# Patient Record
Sex: FEMALE | Race: WHITE | NOT HISPANIC OR LATINO | Employment: OTHER | URBAN - METROPOLITAN AREA
[De-identification: names, ages, dates, MRNs, and addresses within clinical notes are randomized per-mention and may not be internally consistent; named-entity substitution may affect disease eponyms.]

---

## 2020-01-29 NOTE — PATIENT DISCUSSION
Patient understands increased risk of complex surgery with possibility of no improvement, loss of vision, infection, hemorrhage, retinal detachment, pain, and more surgery required. Pt aware 50% chance of capsular break and require 2nd surgery.

## 2020-01-29 NOTE — PATIENT DISCUSSION
Book For 30 min time slot / [place] / COMPLEX / BLOCK/ Posterior Polar cataract. Planned anterior vitrectomy.

## 2022-04-20 ENCOUNTER — COMPREHENSIVE EXAM (OUTPATIENT)
Dept: URBAN - METROPOLITAN AREA CLINIC 37 | Facility: CLINIC | Age: 67
End: 2022-04-20

## 2022-04-20 DIAGNOSIS — H52.4: ICD-10-CM

## 2022-04-20 DIAGNOSIS — H52.13: ICD-10-CM

## 2022-04-20 PROCEDURE — 92014 COMPRE OPH EXAM EST PT 1/>: CPT

## 2022-04-20 PROCEDURE — 92015 DETERMINE REFRACTIVE STATE: CPT

## 2022-04-20 ASSESSMENT — VISUAL ACUITY
OU_CC: 20/20
OS_CC: 20/25
OD_CC: 20/25

## 2022-04-20 ASSESSMENT — TONOMETRY
OD_IOP_MMHG: 16
OS_IOP_MMHG: 15

## 2024-02-05 NOTE — PATIENT DISCUSSION
Discussed the importance of blood sugar control in the prevention of ocular complications. 4 = No assist / stand by assistance

## 2025-05-27 ENCOUNTER — CONSULTATION/EVALUATION (OUTPATIENT)
Age: 70
End: 2025-05-27

## 2025-05-27 DIAGNOSIS — H35.372: ICD-10-CM

## 2025-05-27 DIAGNOSIS — H25.813: ICD-10-CM

## 2025-05-27 DIAGNOSIS — H52.13: ICD-10-CM

## 2025-05-27 DIAGNOSIS — H52.4: ICD-10-CM

## 2025-05-27 DIAGNOSIS — H04.123: ICD-10-CM

## 2025-05-27 PROCEDURE — 92025-3 CORNEAL TOPO, REFUSED: Mod: NC

## 2025-05-27 PROCEDURE — 92015 DETERMINE REFRACTIVE STATE: CPT

## 2025-05-27 PROCEDURE — 92134 CPTRZ OPH DX IMG PST SGM RTA: CPT

## 2025-05-27 PROCEDURE — 99214 OFFICE O/P EST MOD 30 MIN: CPT

## 2025-05-27 PROCEDURE — 92136 OPHTHALMIC BIOMETRY: CPT

## 2025-05-27 RX ORDER — MOXIFLOXACIN OPHTHALMIC 5 MG/ML: 1 SOLUTION/ DROPS OPHTHALMIC

## 2025-05-27 RX ORDER — KETOROLAC TROMETHAMINE 5 MG/ML: 1 SOLUTION OPHTHALMIC

## 2025-05-27 RX ORDER — PREDNISOLONE ACETATE 10 MG/ML: 1 SUSPENSION/ DROPS OPHTHALMIC

## 2025-06-12 ENCOUNTER — SURGERY/PROCEDURE (OUTPATIENT)
Age: 70
End: 2025-06-12

## 2025-06-12 ENCOUNTER — PRE-OP/H&P (OUTPATIENT)
Age: 70
End: 2025-06-12

## 2025-06-12 DIAGNOSIS — H04.123: ICD-10-CM

## 2025-06-12 DIAGNOSIS — H35.372: ICD-10-CM

## 2025-06-12 DIAGNOSIS — H25.813: ICD-10-CM

## 2025-06-12 DIAGNOSIS — H52.13: ICD-10-CM

## 2025-06-12 DIAGNOSIS — H52.4: ICD-10-CM

## 2025-06-12 PROCEDURE — 99211HP H&P OFFICE/OUTPATIENT VISIT, EST

## 2025-06-12 PROCEDURE — 66984 XCAPSL CTRC RMVL W/O ECP: CPT

## 2025-06-13 ENCOUNTER — POST-OP (OUTPATIENT)
Age: 70
End: 2025-06-13

## 2025-06-13 DIAGNOSIS — Z96.1: ICD-10-CM

## 2025-06-13 PROCEDURE — 99024 POSTOP FOLLOW-UP VISIT: CPT

## 2025-06-19 ENCOUNTER — PRE-OP/H&P (OUTPATIENT)
Age: 70
End: 2025-06-19

## 2025-06-19 ENCOUNTER — SURGERY/PROCEDURE (OUTPATIENT)
Age: 70
End: 2025-06-19

## 2025-06-19 DIAGNOSIS — H25.812: ICD-10-CM

## 2025-06-19 DIAGNOSIS — H35.372: ICD-10-CM

## 2025-06-19 DIAGNOSIS — H04.123: ICD-10-CM

## 2025-06-19 PROCEDURE — 66984 XCAPSL CTRC RMVL W/O ECP: CPT | Mod: 79,LT

## 2025-06-19 PROCEDURE — 99211HP PRE-OP

## 2025-06-20 ENCOUNTER — POST-OP (OUTPATIENT)
Age: 70
End: 2025-06-20

## 2025-06-20 DIAGNOSIS — Z96.1: ICD-10-CM

## 2025-06-20 PROCEDURE — 99024 POSTOP FOLLOW-UP VISIT: CPT

## 2025-07-16 ENCOUNTER — POST-OP (OUTPATIENT)
Age: 70
End: 2025-07-16

## 2025-07-16 DIAGNOSIS — Z96.1: ICD-10-CM

## 2025-07-16 PROCEDURE — 99024 POSTOP FOLLOW-UP VISIT: CPT
